# Patient Record
Sex: MALE
[De-identification: names, ages, dates, MRNs, and addresses within clinical notes are randomized per-mention and may not be internally consistent; named-entity substitution may affect disease eponyms.]

---

## 2020-09-09 ENCOUNTER — HOSPITAL ENCOUNTER (OUTPATIENT)
Dept: HOSPITAL 92 - TBSIIMAG | Age: 20
Discharge: HOME | End: 2020-09-09
Attending: PHYSICIAN ASSISTANT
Payer: COMMERCIAL

## 2020-09-09 DIAGNOSIS — S12.9XXA: Primary | ICD-10-CM

## 2020-09-09 PROCEDURE — 72040 X-RAY EXAM NECK SPINE 2-3 VW: CPT

## 2020-09-09 NOTE — RAD
Cervical spine 3 views:

9/9/2020



HISTORY: Cervical spine fracture, prior trauma



FINDINGS: Prior CT examination of the cervical spine performed 8/9/2020 demonstrated multiple prior l
eft-sided fractures. Those fractures are not well assessed on this examination secondary to

technique. Open-mouth odontoid view demonstrates a normal-appearing dens and C1-2 articulation.



No anterolisthesis or retrolisthesis is noted. No prevertebral soft tissue swelling. Cervical vertebr
al body height and alignment appears grossly unremarkable on the frontal and lateral views.

Detailed assessment for interval healing of previously noted fractures would require CT.



IMPRESSION: Cervical spine series as detailed above.



Reported By: Erasto Melton 

Electronically Signed:  9/9/2020 1:35 PM